# Patient Record
Sex: MALE | Race: BLACK OR AFRICAN AMERICAN | NOT HISPANIC OR LATINO | Employment: FULL TIME | URBAN - METROPOLITAN AREA
[De-identification: names, ages, dates, MRNs, and addresses within clinical notes are randomized per-mention and may not be internally consistent; named-entity substitution may affect disease eponyms.]

---

## 2017-05-17 ENCOUNTER — OFFICE VISIT (OUTPATIENT)
Dept: INTERNAL MEDICINE | Facility: CLINIC | Age: 32
End: 2017-05-17
Payer: COMMERCIAL

## 2017-05-17 VITALS
SYSTOLIC BLOOD PRESSURE: 128 MMHG | HEART RATE: 78 BPM | HEIGHT: 71 IN | BODY MASS INDEX: 22.99 KG/M2 | DIASTOLIC BLOOD PRESSURE: 88 MMHG | WEIGHT: 164.25 LBS | OXYGEN SATURATION: 99 %

## 2017-05-17 DIAGNOSIS — R63.2 POLYPHAGIA(783.6): ICD-10-CM

## 2017-05-17 DIAGNOSIS — R35.89 POLYURIA: ICD-10-CM

## 2017-05-17 DIAGNOSIS — R03.0 ELEVATED BLOOD PRESSURE READING WITHOUT DIAGNOSIS OF HYPERTENSION: ICD-10-CM

## 2017-05-17 DIAGNOSIS — Z00.00 ANNUAL PHYSICAL EXAM: Primary | ICD-10-CM

## 2017-05-17 PROCEDURE — 99999 PR PBB SHADOW E&M-NEW PATIENT-LVL III: CPT | Mod: PBBFAC,,, | Performed by: INTERNAL MEDICINE

## 2017-05-17 PROCEDURE — 99385 PREV VISIT NEW AGE 18-39: CPT | Mod: S$GLB,,, | Performed by: INTERNAL MEDICINE

## 2017-05-17 RX ORDER — SERTRALINE HYDROCHLORIDE 25 MG/1
25 TABLET, FILM COATED ORAL DAILY
COMMUNITY

## 2017-05-17 RX ORDER — METHOCARBAMOL 750 MG/1
750 TABLET, FILM COATED ORAL DAILY PRN
COMMUNITY

## 2017-05-17 RX ORDER — NAPROXEN 500 MG/1
500 TABLET ORAL DAILY PRN
COMMUNITY

## 2017-05-17 NOTE — MR AVS SNAPSHOT
[facility]  [address]  [place] [address]  Phone: [phone]  Fax: [phone]                  Philip June   2017 1:00 PM   Office Visit    Description:  Male : 1985   Provider:  Augustine Thurston MD   Department:  [facility]           Reason for Visit     Establish Care           Diagnoses this Visit        Comments    Annual physical exam    -  Primary     Elevated blood pressure reading without diagnosis of hypertension         Polyphagia         Polyuria                To Do List           Future Appointments        Provider Department Dept Phone    2017 12:30 PM LAB, [facility] [phone]    2017 1:00 PM LAB, [facility] [phone]    2017 1:15 PM LAB, [facility] [phone]    2017 1:45 PM LAB, [facility] [phone]    2017 2:00 PM LAB, [facility] [phone]      Goals (5 Years of Data)     None      Follow-Up and Disposition     Return in about 1 year (around 2018), or if symptoms worsen or fail to improve.      [facility]     [facility] Nurse Care Line -  Assistance  Unless otherwise directed by your provider, please contact [facility], our nurse care line that is available for  assistance.     Registered nurses in the [facility] provide: appointment scheduling, clinical advisement, health education, and other advisory services.  Call: [phone] (toll free)               Medications           Message regarding Medications     Verify the changes and/or additions to your medication regime listed below are the same as discussed with your clinician today.  If any of these changes or additions are incorrect, please notify your healthcare provider.             Verify that the below list of medications is an accurate representation of the  "medications you are currently taking.  If none reported, the list may be blank. If incorrect, please contact your healthcare provider. Carry this list with you in case of emergency.           Current Medications     sertraline (ZOLOFT) 25 MG tablet Take 25 mg by mouth once daily.    methocarbamol (ROBAXIN) 750 MG Tab Take 750 mg by mouth daily as needed.    naproxen (NAPROSYN) 500 MG tablet Take 500 mg by mouth daily as needed.           Clinical Reference Information           Your Vitals Were     BP Pulse Height Weight SpO2 BMI    128/88 (BP Location: Left arm, Patient Position: Sitting, BP Method: Manual) 78 5' 11" (1.803 m) 74.5 kg (164 lb 3.9 oz) 99% 22.91 kg/m2      Blood Pressure          Most Recent Value    BP  128/88      Allergies as of 5/17/2017     Primaquine      Immunizations Administered on Date of Encounter - 5/17/2017     None      Orders Placed During Today's Visit      Normal Orders This Visit    C. trachomatis/N. gonorrhoeae by AMP DNA Urine     Future Labs/Procedures Expected by Expires    CBC auto differential  5/17/2017 7/16/2018    Comprehensive metabolic panel  5/17/2017 7/16/2018    Hemoglobin A1c  5/17/2017 7/16/2018    HIV-1 and HIV-2 antibodies  5/17/2017 7/16/2018    Lipid panel  5/17/2017 7/16/2018    RPR  5/17/2017 7/16/2018    TSH  5/17/2017 7/16/2018      [facility] Sign-Up     Activating your [facility] account is as easy as 1-2-3!     1) Visit [email or web address], select Sign Up Now, enter this activation code and your date of birth, then select Next.  [email or web address]  Expires: 6/17/2017  2:03 PM      2) Create a username and password to use when you visit [facility] in the future and select a security question in case you lose your password and select Next.    3) Enter your e-mail address and click Sign Up!    Additional Information  If you have questions, please e-mail [email or web address] or call [phone] to talk to our [facility] staff. Remember, [facility] is NOT to be used for " urgent needs. For medical emergencies, dial 911.         Language Assistance Services     ATTENTION: Language assistance services are available, free of charge. Please call [phone].      ATENCIÓN: Si habla rica, tiene a august disposición servicios gratuitos de asistencia lingüística. Llame al [phone].     CHÚ Ý: N?u b?n nói Ti?ng Vi?t, có các d?ch v? h? tr? ngôn ng? mi?n phí dành cho b?n. G?i s? [phone].         [facility] complies with applicable Federal civil rights laws and does not discriminate on the basis of race, color, national origin, age, disability, or sex.

## 2017-05-17 NOTE — PROGRESS NOTES
"Subjective:       Patient ID: Philip Juen is a 32 y.o. male.    Chief Complaint: Establish Care    HPI Comments:   New pt.      [facility] checked for DM2 and says they haven't done that there.    He has been having polyuria and polyphagia x 1 mo.      PMH  -OA in L knee.  Pt says he had a fx of his L patella during  service.  Pt says he takes naproxen and a muscle relaxer for this and also for...  -2 chipped bones in back.  He takes these 3-4x/wk.    -chronic pain in L shoulder radiating to LUE, worse if he lays on it.  This is from an injury.   -MDD.  Has been on sertraline for approx 4 yrs.          Review of Systems   Constitutional: Negative.    HENT: Negative.    Eyes: Negative.    Respiratory: Negative.    Endocrine: Positive for polyphagia and polyuria.       History reviewed. No pertinent past medical history.    History reviewed. No pertinent surgical history.    Family History   Problem Relation Age of Onset    Hypertension Mother     No Known Problems Father     No Known Problems Sister     No Known Problems Brother     No Known Problems Sister        Social History     Social History    Marital status: Single     Spouse name: N/A    Number of children: N/A    Years of education: N/A     Occupational History          Social History Main Topics    Smoking status: Never Smoker    Smokeless tobacco: None    Alcohol use Yes    Drug use: No    Sexual activity: Not Asked     Other Topics Concern    None     Social History Narrative    Single.  He has 3 children, one of whom lives w/him.       Objective:       Vitals:    05/17/17 1317   BP: 128/88   BP Location: Left arm   Patient Position: Sitting   BP Method: Manual   Pulse: 78   SpO2: 99%   Weight: 74.5 kg (164 lb 3.9 oz)   Height: 5' 11" (1.803 m)     Physical Exam   Constitutional: He is oriented to person, place, and time. He appears well-developed and well-nourished.   HENT:   Head: " Normocephalic and atraumatic.   Right Ear: Tympanic membrane, external ear and ear canal normal.   Left Ear: Tympanic membrane, external ear and ear canal normal.   Mouth/Throat: Uvula is midline and oropharynx is clear and moist. No oropharyngeal exudate or posterior oropharyngeal erythema.   Eyes: Conjunctivae and EOM are normal. Pupils are equal, round, and reactive to light.   Neck: Normal range of motion. Neck supple. No thyromegaly present.   Cardiovascular: Normal rate, regular rhythm and normal heart sounds.  Exam reveals no gallop and no friction rub.    No murmur heard.  Pulmonary/Chest: Effort normal and breath sounds normal. He has no wheezes. He has no rhonchi. He has no rales.   Abdominal: Soft. Bowel sounds are normal. He exhibits no distension. There is no tenderness. There is no rebound and no guarding.   Musculoskeletal: Normal range of motion. He exhibits no edema or tenderness.   Lymphadenopathy:     He has no cervical adenopathy.   Neurological: He is alert and oriented to person, place, and time. He has normal strength and normal reflexes. No cranial nerve deficit or sensory deficit. He exhibits normal muscle tone. Coordination normal.   Skin: Skin is warm and dry. No rash noted.   Psychiatric: He has a normal mood and affect. His behavior is normal. Thought content normal.       Assessment:       1. Annual physical exam    2. Elevated blood pressure reading without diagnosis of hypertension    3. Polyphagia    4. Polyuria        Plan:            - Update labs.    Elevated BP - Pt advised regarding lifestyle modifications.  Cont to monitor.      Polyphagia and polyuria - Chk A1c.

## 2017-05-19 ENCOUNTER — LAB VISIT (OUTPATIENT)
Dept: LAB | Facility: OTHER | Age: 32
End: 2017-05-19
Attending: INTERNAL MEDICINE
Payer: COMMERCIAL

## 2017-05-19 DIAGNOSIS — R63.2 POLYPHAGIA(783.6): ICD-10-CM

## 2017-05-19 DIAGNOSIS — R35.89 POLYURIA: ICD-10-CM

## 2017-05-19 DIAGNOSIS — Z00.00 ANNUAL PHYSICAL EXAM: ICD-10-CM

## 2017-05-19 PROCEDURE — 36415 COLL VENOUS BLD VENIPUNCTURE: CPT

## 2017-05-19 PROCEDURE — 83036 HEMOGLOBIN GLYCOSYLATED A1C: CPT

## 2017-05-20 LAB
ESTIMATED AVG GLUCOSE: 80 MG/DL
HBA1C MFR BLD HPLC: 4.4 %

## 2017-05-23 ENCOUNTER — PATIENT MESSAGE (OUTPATIENT)
Dept: INTERNAL MEDICINE | Facility: CLINIC | Age: 32
End: 2017-05-23

## 2021-06-18 ENCOUNTER — HOSPITAL ENCOUNTER (EMERGENCY)
Facility: HOSPITAL | Age: 36
Discharge: HOME | End: 2021-06-18
Attending: EMERGENCY MEDICINE
Payer: OTHER GOVERNMENT

## 2021-06-18 VITALS
SYSTOLIC BLOOD PRESSURE: 131 MMHG | HEIGHT: 71 IN | HEART RATE: 88 BPM | TEMPERATURE: 97 F | WEIGHT: 168 LBS | DIASTOLIC BLOOD PRESSURE: 85 MMHG | OXYGEN SATURATION: 98 % | BODY MASS INDEX: 23.52 KG/M2 | RESPIRATION RATE: 16 BRPM

## 2021-06-18 DIAGNOSIS — F32.A DEPRESSION, UNSPECIFIED DEPRESSION TYPE: Primary | ICD-10-CM

## 2021-06-18 PROCEDURE — 99282 EMERGENCY DEPT VISIT SF MDM: CPT

## 2021-06-18 PROCEDURE — G0426 INPT/ED TELECONSULT50: HCPCS | Mod: GT,,, | Performed by: PSYCHIATRY & NEUROLOGY

## 2021-06-18 PROCEDURE — G0426 PR INPT TELEHEALTH CONSULT 50M: ICD-10-PCS | Mod: GT,,, | Performed by: PSYCHIATRY & NEUROLOGY

## 2022-12-16 ENCOUNTER — HOSPITAL ENCOUNTER (EMERGENCY)
Facility: HOSPITAL | Age: 37
Discharge: STILL ADMITTED OR ADMINISTRATIVE | End: 2022-12-16
Attending: EMERGENCY MEDICINE
Payer: OTHER MISCELLANEOUS

## 2022-12-16 VITALS
BODY MASS INDEX: 24.19 KG/M2 | DIASTOLIC BLOOD PRESSURE: 88 MMHG | RESPIRATION RATE: 18 BRPM | OXYGEN SATURATION: 99 % | HEIGHT: 71 IN | SYSTOLIC BLOOD PRESSURE: 142 MMHG | WEIGHT: 172.81 LBS | HEART RATE: 103 BPM | TEMPERATURE: 98 F

## 2022-12-16 DIAGNOSIS — T14.8XXA OPEN FRACTURE: Primary | ICD-10-CM
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
## 2022-12-16 DIAGNOSIS — Z01.818 PREOPERATIVE CLEARANCE: ICD-10-CM

## 2022-12-16 DIAGNOSIS — M25.529 ELBOW PAIN: ICD-10-CM

## 2022-12-16 LAB
ALBUMIN SERPL BCP-MCNC: 4.9 G/DL (ref 3.5–5.2)
ALP SERPL-CCNC: 58 U/L (ref 55–135)
ALT SERPL W/O P-5'-P-CCNC: 22 U/L (ref 10–44)
ANION GAP SERPL CALC-SCNC: 17 MMOL/L (ref 8–16)
AST SERPL-CCNC: 42 U/L (ref 10–40)
BASOPHILS # BLD AUTO: 0.02 K/UL (ref 0–0.2)
BASOPHILS NFR BLD: 0.2 % (ref 0–1.9)
BILIRUB SERPL-MCNC: 0.9 MG/DL (ref 0.1–1)
BUN SERPL-MCNC: 14 MG/DL (ref 6–20)
CALCIUM SERPL-MCNC: 9.9 MG/DL (ref 8.7–10.5)
CHLORIDE SERPL-SCNC: 97 MMOL/L (ref 95–110)
CO2 SERPL-SCNC: 23 MMOL/L (ref 23–29)
CREAT SERPL-MCNC: 1 MG/DL (ref 0.5–1.4)
DIFFERENTIAL METHOD: ABNORMAL
EOSINOPHIL # BLD AUTO: 0 K/UL (ref 0–0.5)
EOSINOPHIL NFR BLD: 0.3 % (ref 0–8)
ERYTHROCYTE [DISTWIDTH] IN BLOOD BY AUTOMATED COUNT: 11.6 % (ref 11.5–14.5)
EST. GFR  (NO RACE VARIABLE): >60 ML/MIN/1.73 M^2
GLUCOSE SERPL-MCNC: 100 MG/DL (ref 70–110)
HCT VFR BLD AUTO: 42 % (ref 40–54)
HGB BLD-MCNC: 14.5 G/DL (ref 14–18)
IMM GRANULOCYTES # BLD AUTO: 0.04 K/UL (ref 0–0.04)
IMM GRANULOCYTES NFR BLD AUTO: 0.4 % (ref 0–0.5)
LYMPHOCYTES # BLD AUTO: 1.5 K/UL (ref 1–4.8)
LYMPHOCYTES NFR BLD: 14.6 % (ref 18–48)
MCH RBC QN AUTO: 32.3 PG (ref 27–31)
MCHC RBC AUTO-ENTMCNC: 34.5 G/DL (ref 32–36)
MCV RBC AUTO: 94 FL (ref 82–98)
MONOCYTES # BLD AUTO: 0.9 K/UL (ref 0.3–1)
MONOCYTES NFR BLD: 9.2 % (ref 4–15)
NEUTROPHILS # BLD AUTO: 7.7 K/UL (ref 1.8–7.7)
NEUTROPHILS NFR BLD: 75.3 % (ref 38–73)
NRBC BLD-RTO: 0 /100 WBC
PLATELET # BLD AUTO: ABNORMAL K/UL (ref 150–450)
PLATELET BLD QL SMEAR: ABNORMAL
PMV BLD AUTO: ABNORMAL FL (ref 9.2–12.9)
POTASSIUM SERPL-SCNC: 4.3 MMOL/L (ref 3.5–5.1)
PROT SERPL-MCNC: 8.6 G/DL (ref 6–8.4)
RBC # BLD AUTO: 4.49 M/UL (ref 4.6–6.2)
SODIUM SERPL-SCNC: 137 MMOL/L (ref 136–145)
WBC # BLD AUTO: 10.25 K/UL (ref 3.9–12.7)

## 2022-12-16 PROCEDURE — 96376 TX/PRO/DX INJ SAME DRUG ADON: CPT

## 2022-12-16 PROCEDURE — 29105 APPLICATION LONG ARM SPLINT: CPT | Mod: RT

## 2022-12-16 PROCEDURE — 90471 IMMUNIZATION ADMIN: CPT | Performed by: PHYSICIAN ASSISTANT

## 2022-12-16 PROCEDURE — 25000003 PHARM REV CODE 250: Performed by: PHYSICIAN ASSISTANT

## 2022-12-16 PROCEDURE — 63600175 PHARM REV CODE 636 W HCPCS: Performed by: PHYSICIAN ASSISTANT

## 2022-12-16 PROCEDURE — 80053 COMPREHEN METABOLIC PANEL: CPT | Performed by: PHYSICIAN ASSISTANT

## 2022-12-16 PROCEDURE — 96375 TX/PRO/DX INJ NEW DRUG ADDON: CPT

## 2022-12-16 PROCEDURE — 99285 EMERGENCY DEPT VISIT HI MDM: CPT | Mod: 25

## 2022-12-16 PROCEDURE — 85025 COMPLETE CBC W/AUTO DIFF WBC: CPT | Performed by: PHYSICIAN ASSISTANT

## 2022-12-16 PROCEDURE — 96365 THER/PROPH/DIAG IV INF INIT: CPT

## 2022-12-16 PROCEDURE — 90715 TDAP VACCINE 7 YRS/> IM: CPT | Performed by: PHYSICIAN ASSISTANT

## 2022-12-16 RX ORDER — HYDROMORPHONE HYDROCHLORIDE 1 MG/ML
1 INJECTION, SOLUTION INTRAMUSCULAR; INTRAVENOUS; SUBCUTANEOUS
Status: COMPLETED | OUTPATIENT
Start: 2022-12-16 | End: 2022-12-16

## 2022-12-16 RX ORDER — CEFAZOLIN SODIUM 2 G/50ML
2 SOLUTION INTRAVENOUS
Status: COMPLETED | OUTPATIENT
Start: 2022-12-16 | End: 2022-12-16

## 2022-12-16 RX ORDER — MORPHINE SULFATE 4 MG/ML
6 INJECTION, SOLUTION INTRAMUSCULAR; INTRAVENOUS
Status: DISCONTINUED | OUTPATIENT
Start: 2022-12-16 | End: 2022-12-16

## 2022-12-16 RX ORDER — ONDANSETRON 2 MG/ML
4 INJECTION INTRAMUSCULAR; INTRAVENOUS
Status: COMPLETED | OUTPATIENT
Start: 2022-12-16 | End: 2022-12-16

## 2022-12-16 RX ADMIN — SODIUM CHLORIDE 1000 ML: 0.9 INJECTION, SOLUTION INTRAVENOUS at 07:12

## 2022-12-16 RX ADMIN — CEFAZOLIN SODIUM 2 G: 2 SOLUTION INTRAVENOUS at 07:12

## 2022-12-16 RX ADMIN — HYDROMORPHONE HYDROCHLORIDE 1 MG: 1 INJECTION, SOLUTION INTRAMUSCULAR; INTRAVENOUS; SUBCUTANEOUS at 07:12

## 2022-12-16 RX ADMIN — HYDROMORPHONE HYDROCHLORIDE 1 MG: 1 INJECTION, SOLUTION INTRAMUSCULAR; INTRAVENOUS; SUBCUTANEOUS at 08:12

## 2022-12-16 RX ADMIN — TETANUS TOXOID, REDUCED DIPHTHERIA TOXOID AND ACELLULAR PERTUSSIS VACCINE, ADSORBED 0.5 ML: 5; 2.5; 8; 8; 2.5 SUSPENSION INTRAMUSCULAR at 08:12

## 2022-12-16 RX ADMIN — ONDANSETRON 4 MG: 2 INJECTION INTRAMUSCULAR; INTRAVENOUS at 07:12

## 2022-12-17 NOTE — ED PROVIDER NOTES
"Encounter Date: 12/16/2022       History     Chief Complaint   Patient presents with    Arm Injury     Pt reports he works for [employer].  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  Denies hitting head, denies LOC. [facility]  They splinted his right arm and provided pt xrays.       Chief complaint: Arm injury     HPI:    37-year-old male with history of HTN and G6PD deficiency presenting for evaluation of 10/10 R elbow pain status post mechanical slip and fall backwards landing onto his left elbow.  He is evaluated by worker's compensation and had x-ray imaging showing fracture and was placed in a splint and sent to the ED for further evaluation and management.  He denies head injury, LOC, neck pain, back pain, weakness numbness.  Has had intermittent tingling to the right upper extremity since the fall.    Last PO intake 11 AM       The history is provided by the patient.   Review of patient's allergies indicates:   Allergen Reactions    Asp Other (See Comments)     Pt reports he has G6PB and found out he's allergic to ASP    Primaquine      Blood test in the  confirmed he would not be able to tolerate primaquine.     History reviewed. No pertinent past medical history.  History reviewed. No pertinent surgical history.  Family History   Problem Relation Age of Onset    Hypertension Mother     No Known Problems Father     No Known Problems Sister     No Known Problems Brother     No Known Problems Sister      Social History     Tobacco Use    Smoking status: Never   Substance Use Topics    Alcohol use: Yes    Drug use: No     Review of Systems   Constitutional:  Negative for chills and fever.   HENT:  Negative for congestion, ear pain, rhinorrhea and sore throat.    Eyes:  Negative for redness.   Respiratory:  Negative for shortness of breath and stridor.    Cardiovascular:  Negative for chest pain. "   Gastrointestinal:  Negative for abdominal pain, constipation, diarrhea, nausea and vomiting.   Genitourinary:  Negative for dysuria, frequency, hematuria and urgency.   Musculoskeletal:  Positive for arthralgias. Negative for back pain and neck pain.   Skin:  Negative for rash.   Neurological:  Negative for dizziness, speech difficulty, weakness, light-headedness and numbness.   Hematological:  Does not bruise/bleed easily.   Psychiatric/Behavioral:  Negative for confusion.      Physical Exam     Initial Vitals [12/16/22 1700]   BP Pulse Resp Temp SpO2   (!) 143/84 101 20 98 °F (36.7 °C) 98 %      MAP       --         Physical Exam    Nursing note and vitals reviewed.  Constitutional: He appears well-developed and well-nourished.  Non-toxic appearance. He does not have a sickly appearance. No distress.   Tearful, appears uncomfortable due to pain    HENT:   Head: Normocephalic.   Right Ear: Hearing and external ear normal.   Left Ear: Hearing and external ear normal.   Nose: Nose normal.   Mouth/Throat: No trismus in the jaw. No posterior oropharyngeal erythema.   Eyes: Conjunctivae and EOM are normal.   Neck: Neck supple. No tracheal deviation present.   Normal range of motion.  Cardiovascular:  Regular rhythm.   Tachycardia present.   Exam reveals no gallop and no friction rub.       No murmur heard.  Pulmonary/Chest: Breath sounds normal. No tachypnea and no bradypnea. No respiratory distress. He has no wheezes. He has no rhonchi. He has no rales.   Abdominal: Abdomen is soft. Bowel sounds are normal. He exhibits no distension. There is no abdominal tenderness. There is no rebound and no guarding.   Musculoskeletal:         General: Normal range of motion.      Cervical back: Normal range of motion and neck supple.      Comments: Swelling to the R elbow with significant ttp. Unable to range to R elbow   Abrasion overlying the R elbow with puncture wound with active bleeding     No TTP to remainder of the  RUE    Normal strength to bilateral upper extremities    No midline ttp or paraspinal ttp      Lymphadenopathy:     He has no cervical adenopathy.   Neurological: He is alert and oriented to person, place, and time. GCS eye subscore is 4. GCS verbal subscore is 5. GCS motor subscore is 6.   Skin: Skin is warm and dry. No rash noted.   Psychiatric: He has a normal mood and affect. His behavior is normal. Judgment and thought content normal.       ED Course   Procedures  Labs Reviewed   CBC W/ AUTO DIFFERENTIAL - Abnormal; Notable for the following components:       Result Value    RBC 4.49 (*)     MCH 32.3 (*)     Gran % 75.3 (*)     Lymph % 14.6 (*)     Platelet Estimate Clumped (*)     All other components within normal limits   COMPREHENSIVE METABOLIC PANEL - Abnormal; Notable for the following components:    Total Protein 8.6 (*)     AST 42 (*)     Anion Gap 17 (*)     All other components within normal limits   PROTIME-INR   APTT   TYPE & SCREEN          Imaging Results              X-Ray Chest AP Portable (Final result)  Result time 12/16/22 19:19:52      Final result by Patience Randall MD (12/16/22 19:19:52)                   Impression:      No acute cardiopulmonary process identified.      Electronically signed by: Patience Randall MD  Date:    12/16/2022  Time:    19:19               Narrative:    EXAMINATION:  XR CHEST AP PORTABLE    CLINICAL HISTORY:  Encounter for other preprocedural examination    TECHNIQUE:  Single frontal view of the chest was performed.    COMPARISON:  None    FINDINGS:  Cardiac silhouette is normal in size.  Lungs are symmetrically expanded.  No evidence of focal consolidative process, pneumothorax, or significant pleural effusion.  No acute osseous abnormality identified.                                       X-ray Shoulder 2 or More Views Right (Final result)  Result time 12/16/22 18:06:07   Procedure changed from X-Ray Shoulder Trauma Right     Final result by Aureliano Vera,  MD (12/16/22 18:06:07)                   Impression:      1. No acute displaced fracture or dislocation of the right shoulder.      Electronically signed by: Aureliano Vera MD  Date:    12/16/2022  Time:    18:06               Narrative:    EXAMINATION:  XR SHOULDER COMPLETE 2 OR MORE VIEWS RIGHT    CLINICAL HISTORY:  Fall;Unspecified fall, initial encounter    TECHNIQUE:  Two or three views of the right shoulder were performed.    COMPARISON:  None    FINDINGS:  Two views right shoulder.    The right humeral head maintains appropriate relationship with the glenoid.  The acromioclavicular joint is intact.  No acute displaced right rib fracture.  The right lung zones are clear.                                       X-Ray Wrist Complete Right (Final result)  Result time 12/16/22 18:03:15      Final result by Aureliano Vera MD (12/16/22 18:03:15)                   Impression:      1. No acute displaced fracture or dislocation of the wrist, please see above for additional details.      Electronically signed by: Aureliano Vera MD  Date:    12/16/2022  Time:    18:03               Narrative:    EXAMINATION:  XR WRIST COMPLETE 3 VIEWS RIGHT    CLINICAL HISTORY:  Unspecified fall, initial encounter    TECHNIQUE:  PA, lateral, and oblique views of the right wrist were performed.    COMPARISON:  None    FINDINGS:  Four views right wrist.    No acute displaced fracture or dislocation of the wrist.  No radiopaque foreign body.  There is mild edema about the dorsal aspect of the wrist.  There is subcutaneous emphysema about the soft tissues, noting patient has fractures involving the proximal aspects of the radius and ulna, likely open injury, please see separate exam for details.                                       X-Ray Elbow Complete Right (Final result)  Result time 12/16/22 18:02:04      Final result by Aureliano Vera MD (12/16/22 18:02:04)                   Impression:      1. Complex fractures of the proximal  radius and ulna as above.      Electronically signed by: Aureliano Vera MD  Date:    12/16/2022  Time:    18:02               Narrative:    EXAMINATION:  XR ELBOW COMPLETE 3 VIEW RIGHT    CLINICAL HISTORY:  . Pain in unspecified elbow    TECHNIQUE:  AP, lateral, and oblique views of the right elbow were performed.    COMPARISON:  None    FINDINGS:  Three views right elbow.    There is abnormal displacement of the anterior and posterior elbow fat pads.  There is a comminuted complex fracture involving the proximal aspect of the ulna noting numerous fracture fragments lie about the primary fracture plane with approximately 2 cm of proximal fragment distraction.  There is a complex fracture involving the proximal aspect of the radius, the extent of which is obscured by overlying osseous structures appears to be impaction of the radial head.  Several foci of subcutaneous emphysema noted about the elbow suggesting open injury.  Edema overlies the fracture sites.  No dislocation.                                       Medications   sodium chloride 0.9% bolus 1,000 mL 1,000 mL (1,000 mLs Intravenous New Bag 12/16/22 1925)   Tdap (BOOSTRIX) vaccine injection 0.5 mL (0.5 mLs Intramuscular Given 12/16/22 2003)   cefazolin (ANCEF) 2 gram in dextrose 5% 50 mL IVPB (premix) (0 g Intravenous Stopped 12/16/22 1959)   HYDROmorphone injection 1 mg (1 mg Intravenous Given 12/16/22 1908)   ondansetron injection 4 mg (4 mg Intravenous Given 12/16/22 1915)   HYDROmorphone injection 1 mg (1 mg Intravenous Given 12/16/22 2002)     Medical Decision Making:   Clinical Tests:   Lab Tests: Ordered and Reviewed  Radiological Study: Ordered and Reviewed  Medical Tests: Ordered and Reviewed  ED Management:  37-year-old male with history of hypertension and G6 PD deficiency presenting for right elbow pain.  Exam above.  X-ray remarkable for complex fracture.  Examination remarkable for an open fracture.  There is scant amount bleeding from  puncture wound to the right elbow likely from bony fragment.  No neurovascular deficits.    Tetanus updated.  IV Ancef given.  IV fluids and Dilaudid given in the emergency department  Spoke w Orthopedics, Dr. Sarmiento who feels pt is more appropriate for surgical management by Orthopedics trauma.  Posterior splint applied Transferred in stable condition to [facility] for further evaluation management after speaking with Dr. Domingo. Preoperative labs ordered.   Discussed with Dr. Narvaez who agrees with assessment and plan.                           Clinical Impression:   Final diagnoses:  [M25.529] Elbow pain  [W19.XXXA] Fall  [Z01.818] Preoperative clearance  [T14.8XXA] Open fracture (Primary)        ED Disposition Condition    Transfer to Another Facility Stable                Otilia Wong PA-C  12/16/22 2021

## 2022-12-17 NOTE — ED TRIAGE NOTES
"Pt reports he works for [employer]. 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 Denies hitting head, denies LOC. [facility]. They splinted his right arm and provided pt xrays  "

## 2024-11-22 ENCOUNTER — OCCUPATIONAL HEALTH (OUTPATIENT)
Dept: URGENT CARE | Facility: CLINIC | Age: 39
End: 2024-11-22

## 2024-11-22 VITALS — WEIGHT: 180 LBS | HEIGHT: 71 IN | BODY MASS INDEX: 25.2 KG/M2

## 2024-11-22 DIAGNOSIS — Z02.89 ENCOUNTER FOR EXAMINATION REQUIRED BY DEPARTMENT OF TRANSPORTATION (DOT): Primary | ICD-10-CM
